# Patient Record
Sex: MALE | Race: WHITE
[De-identification: names, ages, dates, MRNs, and addresses within clinical notes are randomized per-mention and may not be internally consistent; named-entity substitution may affect disease eponyms.]

---

## 2019-06-12 ENCOUNTER — HOSPITAL ENCOUNTER (EMERGENCY)
Dept: HOSPITAL 94 - ER | Age: 24
LOS: 1 days | Discharge: PSYCHIATRIC OR SUBSTANCE USE FACILITY | End: 2019-06-13
Payer: MEDICARE

## 2019-06-12 VITALS — BODY MASS INDEX: 25.82 KG/M2 | WEIGHT: 180.38 LBS | HEIGHT: 70 IN

## 2019-06-12 DIAGNOSIS — Z79.899: ICD-10-CM

## 2019-06-12 DIAGNOSIS — M25.562: ICD-10-CM

## 2019-06-12 DIAGNOSIS — Z88.8: ICD-10-CM

## 2019-06-12 DIAGNOSIS — Z88.5: ICD-10-CM

## 2019-06-12 DIAGNOSIS — F31.9: ICD-10-CM

## 2019-06-12 DIAGNOSIS — M25.462: ICD-10-CM

## 2019-06-12 DIAGNOSIS — F41.9: ICD-10-CM

## 2019-06-12 DIAGNOSIS — Z88.0: ICD-10-CM

## 2019-06-12 DIAGNOSIS — T42.4X2A: Primary | ICD-10-CM
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
## 2019-06-12 LAB
ALBUMIN SERPL BCP-MCNC: 4 G/DL (ref 3.4–5)
ALBUMIN/GLOB SERPL: 1.1 {RATIO} (ref 1.1–1.5)
ALP SERPL-CCNC: 51 IU/L (ref 46–116)
ALT SERPL W P-5'-P-CCNC: 33 U/L (ref 12–78)
AMPHETAMINES UR QL SCN: NEGATIVE
ANION GAP SERPL CALCULATED.3IONS-SCNC: 6 MMOL/L (ref 8–16)
APAP SERPL-MCNC: < 2 UG/ML (ref 10–30)
APPEARANCE SNV: (no result)
AST SERPL W P-5'-P-CCNC: 13 U/L (ref 10–37)
BARBITURATES UR QL SCN: NEGATIVE
BASOPHILS # BLD AUTO: 0.1 X10'3 (ref 0–0.2)
BASOPHILS NFR BLD AUTO: 0.3 % (ref 0–1)
BENZODIAZ UR QL SCN: NEGATIVE
BILIRUB SERPL-MCNC: 0.3 MG/DL (ref 0.1–1)
BUN SERPL-MCNC: 16 MG/DL (ref 7–18)
BUN/CREAT SERPL: 16.8 (ref 5.4–32)
BZE UR QL SCN: NEGATIVE
CALCIUM SERPL-MCNC: 9.4 MG/DL (ref 8.5–10.1)
CANNABINOIDS UR QL SCN: POSITIVE
CHLORIDE SERPL-SCNC: 102 MMOL/L (ref 99–107)
CLARITY UR: CLEAR
CO2 SERPL-SCNC: 28.3 MMOL/L (ref 24–32)
COLOR SNV: (no result)
COLOR UR: YELLOW
CREAT SERPL-MCNC: 0.95 MG/DL (ref 0.6–1.1)
CRYSTALS SNV MICRO: (no result)
EOSINOPHIL # BLD AUTO: 0.3 X10'3 (ref 0–0.9)
EOSINOPHIL NFR BLD AUTO: 1.7 % (ref 0–6)
ERYTHROCYTE [DISTWIDTH] IN BLOOD BY AUTOMATED COUNT: 13.6 % (ref 11.5–14.5)
ETHANOL SERPL-MCNC: < 0.01 GM/DL (ref 0–0.01)
GFR SERPL CREATININE-BSD FRML MDRD: > 90 ML/MIN
GLUCOSE SERPL-MCNC: 114 MG/DL (ref 70–104)
GLUCOSE UR STRIP-MCNC: NEGATIVE MG/DL
HCT VFR BLD AUTO: 41.3 % (ref 42–52)
HGB BLD-MCNC: 13.7 G/DL (ref 14–17.9)
HGB UR QL STRIP: NEGATIVE
KETONES UR STRIP-MCNC: NEGATIVE MG/DL
LEUKOCYTE ESTERASE UR QL STRIP: NEGATIVE
LYMPHOCYTES # BLD AUTO: 3.4 X10'3 (ref 1.1–4.8)
LYMPHOCYTES NFR BLD AUTO: 18.8 % (ref 21–51)
LYMPHOCYTES NFR SNV: 3 % (ref 0–75)
MCH RBC QN AUTO: 28.7 PG (ref 27–31)
MCHC RBC AUTO-ENTMCNC: 33.2 G/DL (ref 33–36.5)
MCV RBC AUTO: 86.6 FL (ref 78–98)
METHADONE UR QL SCN: NEGATIVE
MONOCYTES # BLD AUTO: 1.2 X10'3 (ref 0–0.9)
MONOCYTES NFR BLD AUTO: 6.9 % (ref 2–12)
MONOCYTES NFR SNV MANUAL: 10 % (ref 0–0)
NEUTROPHILS # BLD AUTO: 13 X10'3 (ref 1.8–7.7)
NEUTROPHILS NFR BLD AUTO: 72.3 % (ref 42–75)
NEUTROPHILS NFR SNV MANUAL: 87 % (ref 0–25)
NITRITE UR QL STRIP: NEGATIVE
OPIATES UR QL SCN: NEGATIVE
PCP UR QL SCN: NEGATIVE
PH UR STRIP: 7.5 [PH] (ref 4.8–8)
PLATELET # BLD AUTO: 313 X10'3 (ref 140–440)
PMV BLD AUTO: 6.1 FL (ref 7.4–10.4)
POTASSIUM SERPL-SCNC: 3.7 MMOL/L (ref 3.5–5.1)
PROT SERPL-MCNC: 7.7 G/DL (ref 6.4–8.2)
PROT UR QL STRIP: NEGATIVE MG/DL
RBC # BLD AUTO: 4.77 X10'6 (ref 4.7–6.1)
RBC # SNV: (no result) /CU MM
SODIUM SERPL-SCNC: 136 MMOL/L (ref 135–145)
SP GR UR STRIP: 1.01 (ref 1–1.03)
URN COLLECT METHOD CLASS: (no result)
UROBILINOGEN UR STRIP-MCNC: 0.2 E.U/DL (ref 0.2–1)
WBC # BLD AUTO: 18 X10'3 (ref 4.5–11)
WBC # SNV MANUAL: (no result) /CU MM (ref 0–200)

## 2019-06-12 PROCEDURE — 85025 COMPLETE CBC W/AUTO DIFF WBC: CPT

## 2019-06-12 PROCEDURE — 80305 DRUG TEST PRSMV DIR OPT OBS: CPT

## 2019-06-12 PROCEDURE — 93005 ELECTROCARDIOGRAM TRACING: CPT

## 2019-06-12 PROCEDURE — 87070 CULTURE OTHR SPECIMN AEROBIC: CPT

## 2019-06-12 PROCEDURE — 85610 PROTHROMBIN TIME: CPT

## 2019-06-12 PROCEDURE — 96365 THER/PROPH/DIAG IV INF INIT: CPT

## 2019-06-12 PROCEDURE — 80178 ASSAY OF LITHIUM: CPT

## 2019-06-12 PROCEDURE — 36415 COLL VENOUS BLD VENIPUNCTURE: CPT

## 2019-06-12 PROCEDURE — 87040 BLOOD CULTURE FOR BACTERIA: CPT

## 2019-06-12 PROCEDURE — 89060 EXAM SYNOVIAL FLUID CRYSTALS: CPT

## 2019-06-12 PROCEDURE — 80329 ANALGESICS NON-OPIOID 1 OR 2: CPT

## 2019-06-12 PROCEDURE — 96375 TX/PRO/DX INJ NEW DRUG ADDON: CPT

## 2019-06-12 PROCEDURE — 81003 URINALYSIS AUTO W/O SCOPE: CPT

## 2019-06-12 PROCEDURE — 89051 BODY FLUID CELL COUNT: CPT

## 2019-06-12 PROCEDURE — 99285 EMERGENCY DEPT VISIT HI MDM: CPT

## 2019-06-12 PROCEDURE — 73564 X-RAY EXAM KNEE 4 OR MORE: CPT

## 2019-06-12 PROCEDURE — 80320 DRUG SCREEN QUANTALCOHOLS: CPT

## 2019-06-12 PROCEDURE — 20610 DRAIN/INJ JOINT/BURSA W/O US: CPT

## 2019-06-12 PROCEDURE — 80053 COMPREHEN METABOLIC PANEL: CPT

## 2019-06-12 RX ADMIN — Medication SCH MG: at 08:40

## 2019-06-12 RX ADMIN — DIVALPROEX SODIUM SCH MG: 125 TABLET, DELAYED RELEASE ORAL at 12:16

## 2019-06-12 RX ADMIN — DIVALPROEX SODIUM SCH MG: 125 TABLET, DELAYED RELEASE ORAL at 20:26

## 2019-06-12 RX ADMIN — LITHIUM CARBONATE SCH MG: 300 TABLET, FILM COATED, EXTENDED RELEASE ORAL at 12:14

## 2019-06-12 RX ADMIN — THERA TABS SCH EACH: TAB at 12:18

## 2019-06-12 RX ADMIN — Medication SCH MG: at 20:39

## 2019-06-12 RX ADMIN — LITHIUM CARBONATE SCH MG: 300 TABLET, FILM COATED, EXTENDED RELEASE ORAL at 20:28

## 2019-06-12 NOTE — NUR
This patient ate approximately 10 percent of his dinner. The patient is awake 
and well oriented. His thought process is linear. He is warm and dry, he has 
good color. The patient speaks clearly to this writer. The patient denies S/I 
or H/I at this time. He denies hallucinations. The patient complains of 
moderate depression which he believes might be increasing. This patient is 
advised that he is in a safe place. His bed is in direct view from the nursing 
station. Q15 minute rounding is being done for patient safety.

## 2019-06-12 NOTE — NUR
He is not feverish, however his left knee is very warm to the touch and a 
little edematous and fluctuant.

## 2019-06-12 NOTE — NUR
Erin from poison control called RN. RN updated Erin on pt status, VS, and 
labs. Per poison control "case is now closed."

## 2019-06-12 NOTE — NUR
ORDERED A LEFT KNEE XRAY ON HIM, WHEN HE GOT UP TO USE THE BR HE IS LIMPING 
REALLY BADLY AND HE SAID THAT HE HAD FALLEN A FEW DAYS AGO AND IT IS REALLY 
SORE AND SWOLLEN.

## 2019-06-12 NOTE — NUR
ASSUMED PATIENT CARE. PATIENT IS MID FOWLERS IN BED, EATING DINNER, HE IS IN 
VIEW OF NURSING STATION.

## 2019-06-12 NOTE — NUR
POISON CONTROL: EXPECT SLEEPINESS. THERE SHOULD BE NO AIRWAY OR CARDIAC ISSUES. 
MONITOR HIS FOR FOUR HOURS. IF NO PROBLEMS AFTER 4 HOURS, HE IS CLEAR.

## 2019-06-12 NOTE — NUR
PT  MOVED FROM BED 07 TO BED 24 BY [facility] TECH, CHART AND ORIGINAL 5150 
BROUGHT TO OVERFLOW WITH PT.

## 2019-06-13 VITALS — DIASTOLIC BLOOD PRESSURE: 73 MMHG | SYSTOLIC BLOOD PRESSURE: 132 MMHG

## 2019-06-13 LAB
ALBUMIN SERPL BCP-MCNC: 3.9 G/DL (ref 3.4–5)
ALBUMIN/GLOB SERPL: 1 {RATIO} (ref 1.1–1.5)
ALP SERPL-CCNC: 51 IU/L (ref 46–116)
ALT SERPL W P-5'-P-CCNC: 23 U/L (ref 12–78)
ANION GAP SERPL CALCULATED.3IONS-SCNC: 9 MMOL/L (ref 8–16)
AST SERPL W P-5'-P-CCNC: 15 U/L (ref 10–37)
BASOPHILS # BLD AUTO: 0 X10'3 (ref 0–0.2)
BASOPHILS NFR BLD AUTO: 0.1 % (ref 0–1)
BILIRUB SERPL-MCNC: 0.5 MG/DL (ref 0.1–1)
BUN SERPL-MCNC: 10 MG/DL (ref 7–18)
BUN/CREAT SERPL: 11.2 (ref 5.4–32)
CALCIUM SERPL-MCNC: 9.5 MG/DL (ref 8.5–10.1)
CHLORIDE SERPL-SCNC: 104 MMOL/L (ref 99–107)
CO2 SERPL-SCNC: 24.3 MMOL/L (ref 24–32)
CREAT SERPL-MCNC: 0.89 MG/DL (ref 0.6–1.1)
EOSINOPHIL # BLD AUTO: 0 X10'3 (ref 0–0.9)
EOSINOPHIL NFR BLD AUTO: 0 % (ref 0–6)
ERYTHROCYTE [DISTWIDTH] IN BLOOD BY AUTOMATED COUNT: 13.5 % (ref 11.5–14.5)
GFR SERPL CREATININE-BSD FRML MDRD: > 90 ML/MIN
GLUCOSE SERPL-MCNC: 125 MG/DL (ref 70–104)
HCT VFR BLD AUTO: 40.3 % (ref 42–52)
HGB BLD-MCNC: 13.4 G/DL (ref 14–17.9)
LYMPHOCYTES # BLD AUTO: 2 X10'3 (ref 1.1–4.8)
LYMPHOCYTES NFR BLD AUTO: 13.7 % (ref 21–51)
MCH RBC QN AUTO: 29 PG (ref 27–31)
MCHC RBC AUTO-ENTMCNC: 33.1 G/DL (ref 33–36.5)
MCV RBC AUTO: 87.5 FL (ref 78–98)
MONOCYTES # BLD AUTO: 0.8 X10'3 (ref 0–0.9)
MONOCYTES NFR BLD AUTO: 5.7 % (ref 2–12)
NEUTROPHILS # BLD AUTO: 11.6 X10'3 (ref 1.8–7.7)
NEUTROPHILS NFR BLD AUTO: 80.5 % (ref 42–75)
PLATELET # BLD AUTO: 293 X10'3 (ref 140–440)
PMV BLD AUTO: 6.3 FL (ref 7.4–10.4)
POTASSIUM SERPL-SCNC: 4.1 MMOL/L (ref 3.5–5.1)
PROT SERPL-MCNC: 7.9 G/DL (ref 6.4–8.2)
RBC # BLD AUTO: 4.6 X10'6 (ref 4.7–6.1)
SODIUM SERPL-SCNC: 137 MMOL/L (ref 135–145)
WBC # BLD AUTO: 14.4 X10'3 (ref 4.5–11)

## 2019-06-13 RX ADMIN — DIVALPROEX SODIUM SCH MG: 125 TABLET, DELAYED RELEASE ORAL at 09:50

## 2019-06-13 RX ADMIN — THERA TABS SCH EACH: TAB at 09:52

## 2019-06-13 RX ADMIN — LITHIUM CARBONATE SCH MG: 300 TABLET, FILM COATED, EXTENDED RELEASE ORAL at 09:50

## 2019-06-13 RX ADMIN — Medication SCH MG: at 09:52

## 2019-06-13 RX ADMIN — DIVALPROEX SODIUM SCH MG: 125 TABLET, DELAYED RELEASE ORAL at 13:26

## 2019-06-13 NOTE — NUR
Patient accepted at [facility] after d/c of another patient upstairs.  Advised 
patient.  Patient is glad to go to [facility].  Continue to monitor.

## 2019-06-13 NOTE — NUR
This patient once again  awoke with an episode of N/V. Vomitis is bile in 
nature. This writer consulted with the ER MD. Alix will be anministered next.

## 2019-06-13 NOTE — NUR
This patient is now sleeping quietly. The Zofran apears to have had a 
theraputic effect. This writer will continue to observe closely.

## 2019-06-13 NOTE — NUR
pt is in bed on left side, eyes closed, appears to be asleep, regular breathing 
present, no s/s of distress observed

## 2019-06-13 NOTE — NUR
Patient appears to be sleeping.  Patient hardly slept last night.  No distress 
observed.  Continue to monitor.

## 2019-06-13 NOTE — NUR
recvd report from Cassie FRANKEL, assumed care of pt. Pt is sitting up in bed 
speaking w/his mother, awaiting transfer to [facility]. Rcvd call from [facility] 
confirming they would be coming to get patient shortly.

## 2019-06-13 NOTE — NUR
Patient awake and reading a book.  Patient hardly slept last night per report.  
Patient to have his blood drawn this morning for basic labs and lithium level.  
No distress observed.  Continue to monitor.

## 2019-06-13 NOTE — NUR
This patient remains awake. He was given po Reglan along with Reglan. His 
nausea has not returned to date.

## 2019-06-13 NOTE — NUR
Patient awake and alert and c/o depression and long time of suicidal ideation.  
Patient states he is doing fine then all of a sudden he starts listening to 
himself telling him he has no future, he is not worth anything, etc.  And then 
he wants to kill himself.  RN spoke to patient about rewiring his brain with 
good thoughts about himself.  Patient verbalized understanding.  Continue ot 
monitor.

## 2019-06-13 NOTE — NUR
Patient is feeling nausea.  RN got patient a emesis bag.  Patient breathing 
slowly to avoid emesis.  Continue to monitor.

## 2019-06-13 NOTE — NUR
This patient awoke a half hour ago with a sudden onset of N/V. He had a bile 
emesis X1, felt better and went back to bed. He then after resting experienced 
the same type of event once more. This writer administered Zofran IV via his 
saline lock. Orthostatic vital signs were done, sitting to standing, they were 
unremarkable. Patients color was pale, his skin moist following emesis. His 
conjunctiva was pink. He has CSM less than 2 seconds. He does not present with 
abdominal tenderness.

## 2020-12-10 ENCOUNTER — HOSPITAL ENCOUNTER (EMERGENCY)
Dept: HOSPITAL 94 - ER | Age: 25
Discharge: HOME | End: 2020-12-10
Payer: MEDICARE

## 2020-12-10 VITALS — BODY MASS INDEX: 27.44 KG/M2 | WEIGHT: 185.3 LBS | HEIGHT: 69 IN

## 2020-12-10 VITALS — DIASTOLIC BLOOD PRESSURE: 97 MMHG | SYSTOLIC BLOOD PRESSURE: 145 MMHG
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
## 2020-12-10 DIAGNOSIS — Z88.5: ICD-10-CM

## 2020-12-10 DIAGNOSIS — Z88.0: ICD-10-CM

## 2020-12-10 DIAGNOSIS — F12.10: ICD-10-CM

## 2020-12-10 DIAGNOSIS — F31.9: ICD-10-CM

## 2020-12-10 DIAGNOSIS — I10: ICD-10-CM
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
## 2020-12-10 DIAGNOSIS — F41.9: ICD-10-CM

## 2020-12-10 DIAGNOSIS — Z79.899: ICD-10-CM

## 2020-12-10 DIAGNOSIS — S60.454A: Primary | ICD-10-CM
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
## 2020-12-10 DIAGNOSIS — Z88.8: ICD-10-CM

## 2020-12-10 PROCEDURE — 99284 EMERGENCY DEPT VISIT MOD MDM: CPT

## 2021-04-14 ENCOUNTER — HOSPITAL ENCOUNTER (EMERGENCY)
Dept: HOSPITAL 94 - ER | Age: 26
Discharge: HOME | End: 2021-04-14
Payer: MEDICARE

## 2021-04-14 VITALS — SYSTOLIC BLOOD PRESSURE: 114 MMHG | DIASTOLIC BLOOD PRESSURE: 57 MMHG

## 2021-04-14 VITALS — BODY MASS INDEX: 25.82 KG/M2 | HEIGHT: 70 IN | WEIGHT: 180.38 LBS

## 2021-04-14 DIAGNOSIS — F41.9: ICD-10-CM
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
## 2021-04-14 DIAGNOSIS — Z88.5: ICD-10-CM

## 2021-04-14 DIAGNOSIS — Z88.8: ICD-10-CM

## 2021-04-14 DIAGNOSIS — F31.9: ICD-10-CM

## 2021-04-14 DIAGNOSIS — F12.90: ICD-10-CM

## 2021-04-14 DIAGNOSIS — S61.012A: Primary | ICD-10-CM

## 2021-04-14 DIAGNOSIS — I10: ICD-10-CM
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
## 2021-04-14 DIAGNOSIS — Z79.899: ICD-10-CM

## 2021-04-14 DIAGNOSIS — Z88.0: ICD-10-CM
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
## 2021-04-14 PROCEDURE — 12002 RPR S/N/AX/GEN/TRNK2.6-7.5CM: CPT

## 2021-04-14 PROCEDURE — 99282 EMERGENCY DEPT VISIT SF MDM: CPT
